# Patient Record
(demographics unavailable — no encounter records)

---

## 2024-10-08 NOTE — REVIEW OF SYSTEMS
[As Noted in HPI] : as noted in HPI [Anxiety] : anxiety [Depression] : depression [Negative] : Integumentary

## 2024-10-11 NOTE — HISTORY OF PRESENT ILLNESS
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    Currently in NP school. Not working at the time because of memory issues interfering with his work. Lives at home with Parents and Grandmother. Has 1 sister.  Denies any use of alcohol, cigarettes and illicit drugs.  Started drinking Ginko tea 1 week ago but denies use of any other herbs and supplements.   No family history of liver disease or cancer.   Patient was last seen by Dr. Pop in  and lost to follow up.  According to patient, referred by Cards (Dr. Epperson) due to "recent increase in IVC pressures and a cirrhotic-looking liver in CT since ". There is no image available in our EMR.   Patient feels well with good energy levels and excellent exercise tolerance.  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 Started on Bupropion due to concerns of depression.  No history of chest pain, SOB, abdominal pain, jaundice, ascites or edema, hematemesis or melena.  No recent weight loss or weight gain.   [Labs] - 2024: normal LFTs.  - HbA1c 6.4% in . Hyperlipidemia, elevated LDL and normal AFP in .  - HepB immune. Hep A and C negative in .   Test results:  - MRI/MRE () Subtle hepatic surface nodularity. No suspicious hepatic lesion. IVC and hepatic veins are not dilated. No steatosis, no iron deposition. Liver stiffness: 4-5 kPa - stage 3 to 4 fibrosis.

## 2024-10-11 NOTE — ASSESSMENT
[FreeTextEntry1] : Mr. FRANZ is a 27-year-old man with pre-DM, HLD, h/o Fontan procedure as a  due to hypoplastic left heart syndrome and bilateral superior vena cavae, IVC stent, paroxysmal Afib, R renal infarct x2, anxiety/depression.   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  PLT low normal.  - imaging: MR elastography showed subtle surface nodularity, normal spleen and gallbladder. Patent hepatic veins   - no liver lesion   - DD of nodularity includes cirrhosis and NRH (nodular regenerative hyperplasia) - overweight BMI, but due to muscular built  - recently increased increased IVC pressure as per cardiology note  - forgetfulness as above: unlikely due to liver disease as bloodwork last year and imaging overall do not suggest advanced liver disease. 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   PLAN - MELD labs, Ammonia, HbA1c, Lipid panel, and AFP/AFP-L3.  - Abdominal US  - Fibro Scan  - may need screening EGD   - forgetfulness: trial of snacks between meals - RTO in 3 months   The patient was seen with Dr. Nagi Bustos.  I was physically present for key portions of the exam. I have made edits to the note above where necessary and agree with the assessment and plan, which were discussed with me.

## 2024-10-11 NOTE — PHYSICAL EXAM
[General Appearance - Alert] : alert [General Appearance - In No Acute Distress] : in no acute distress [General Appearance - Well Nourished] : well nourished [General Appearance - Well Developed] : well developed [General Appearance - Well-Appearing] : healthy appearing [Sclera] : the sclera and conjunctiva were normal [Respiration, Rhythm And Depth] : normal respiratory rhythm and effort [Heart Rate And Rhythm] : heart rate was normal and rhythm regular [Abdomen Soft] : soft [Abdomen Tenderness] : non-tender [Skin Color & Pigmentation] : normal skin color and pigmentation [] : no rash [Skin Lesions] : no skin lesions [No Focal Deficits] : no focal deficits [Oriented To Time, Place, And Person] : oriented to person, place, and time [Impaired Insight] : insight and judgment were intact [Affect] : the affect was normal [Mood] : the mood was normal [Scleral Icterus] : No Scleral Icterus [Abdominal  Ascites] : no ascites [Asterixis] : no asterixis observed [Jaundice] : No jaundice

## 2024-11-20 NOTE — PHYSICAL EXAM
[General Appearance - Alert] : alert [General Appearance - In No Acute Distress] : in no acute distress [Auscultation Breath Sounds / Voice Sounds] : breath sounds clear to auscultation bilaterally [Chest Surgical / Traumatic Scar] : chest incision well healed [Heart Rate And Rhythm] : normal heart rate and rhythm [Heart Sounds Click] : no clicks [Abdomen Soft] : soft [Bowel Sounds] : normal bowel sounds [Nondistended] : nondistended [Abdomen Tenderness] : non-tender [Nail Clubbing] : no clubbing  or cyanosis of the fingernails [Motor Tone] : normal muscle strength and tone [Cervical Lymph Nodes Enlarged Anterior] : The anterior cervical nodes were normal [Cervical Lymph Nodes Enlarged Posterior] : The posterior cervical nodes were normal [] : no rash [Skin Lesions] : no lesions [Skin Turgor] : normal turgor [Demonstrated Behavior - Infant Nonreactive To Parents] : interactive [Mood] : mood and affect were appropriate for age [Demonstrated Behavior] : normal behavior [FreeTextEntry7] : single S1S2, SM

## 2024-11-20 NOTE — DISCUSSION/SUMMARY
[Needs SBE Prophylaxis] : [unfilled]  needs bacterial endocarditis prophylaxis. SBE prophylaxis is indicated for dental and invasive ENT procedures. (Circulation. 2007; 116: 2606-8050) [FreeTextEntry1] : Yasmani is a 26-year-old young man with HLHS, s/p Fontan procedure s/p renal infarct x 2.  Long-term complications of Fontan include arrhythmia, valve disorders, thromboembolic events, heart failure, both diastolic and systolic, protein losing enteropathy, liver, and kidney disease.   From a functional perspective, Yasmani is doing well. His chest pain resolved with allopurinol. He will have a holter, given his history of SVT.   He was evaluated by renal.  It was recommended he have yearly testing of creatinine/eGFR and urine albumin-to-creatinine ratio.   We discussed that the risk of a thrombo-embolic event can have devastating effects and he understands the importance of adherence with Xarelto.    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 He has an evaluation with Dr. Melissa Piedra, hepatologist.   SBE Prophylaxis We also discussed good dental hygiene with routine dental visits every six months. As per ACC guidelines, endocarditis prophylaxis is recommended in those undergoing dental procedures with prosthetic cardiac valves, unrepaired cyanotic heart disease including palliative shunts and conduits, repaired CHD with residual defects at site of adjacent to patch or prosthetic device, repaired CHD with prosthetic material or device during the first 6 months after the procedure and in those patients with a history of infective endocarditis. Amoxicillin 2gm or another appropriate antibiotic should be taken 30 to 60 minutes prior to the procedure.   Heart Healthy Lifestyle We discussed a heart-healthy lifestyle, including Mediterranean diet, weight management, and avoiding smoking/vaping and excessive alcohol. We also discussed exercising 30 minute a day.   Medical Home Part of today's discussion included identifying a core medical team for as the patient's medical home. It is important that the patient feels comfortable with providers in subspecialties and that the providers are well-versed in the patient's cardiac condition. We can provide referrals to doctors who we have identified as collaborative and conscientious of the patient's complex medical history.    PLAN: -annual follow up with PCP -yearly creatinine/eGFR and urine albumin-to-creatinine ratio -Hepatology follow up -continue Xarelto - holter

## 2024-11-20 NOTE — PHYSICAL EXAM
[General Appearance - Alert] : alert [General Appearance - In No Acute Distress] : in no acute distress [Auscultation Breath Sounds / Voice Sounds] : breath sounds clear to auscultation bilaterally [Chest Surgical / Traumatic Scar] : chest incision well healed [Heart Rate And Rhythm] : normal heart rate and rhythm [Heart Sounds Click] : no clicks [Abdomen Soft] : soft [Bowel Sounds] : normal bowel sounds [Nondistended] : nondistended [Abdomen Tenderness] : non-tender [Nail Clubbing] : no clubbing  or cyanosis of the fingernails [Motor Tone] : normal muscle strength and tone [Cervical Lymph Nodes Enlarged Anterior] : The anterior cervical nodes were normal [Cervical Lymph Nodes Enlarged Posterior] : The posterior cervical nodes were normal [Skin Lesions] : no lesions [] : no rash [Skin Turgor] : normal turgor [Demonstrated Behavior - Infant Nonreactive To Parents] : interactive [Demonstrated Behavior] : normal behavior [Mood] : mood and affect were appropriate for age [FreeTextEntry7] : single S1S2, SM

## 2024-11-20 NOTE — HISTORY OF PRESENT ILLNESS
[FreeTextEntry1] : We had the pleasure of seeing Yasmani Lorenzo in [facility]. 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 He underwent staged repair by Dr. Ordonez.  1. 2/1997 - [place] procedure, Dr. Ordonez. [facility] 2. 10/1997 - Bilateral bi-directional chastity, Dr. Ordonez. [facility], [place], [place] 3. 2000 - Modified non fenestrated lateral tunnel Fontan, Dr. Ordonez. [place], [place]  Yasmani underwent diagnostic cath February 2018. His previously placed Fontan limb stent and branch pulmonary artery stent were patent. His mean Fontan pressures were 12 mmHg with wedge pressures equal to RV end diastolic pressure of 6 mmHg. His Cardiac output was 4.5 L/min, Qp:Qs 0.72:1, and a PVRi of 2.62 W*U. The source of the desaturation was identified as a hepatic collateral vessel which could not be engaged for catheter based closure. Sats were 88% on room air.  Yasmani has a remote history of SVT noted on Holter. In the past, he was on Betaxolol but discontinued it due to "brain fog" and sexual dysfunction. He has remained off antiarrhythmics. His screening Holters have been negative for any arrhythmia.  He is propranolol for anxiety.   He has a history of a renal infarct of unknown etiology and duration. He was started on and remains on Xarelto.  He feels nauseous after fatty meals and will follow up with GI.    He had a recent ER visit for reproducible chest pain. Troponin was negative and he knows from previous experience that this was costochondritis. He had his uric acid tested and was started on allopurinol.  His chest pain resolved after starting treatment.    He denies chest pain, shortness of breath, palpitations, dizziness, syncope, swelling of leg or PND.  He can walk a flight of stairs without difficulty.

## 2024-11-20 NOTE — CONSULT LETTER
[Today's Date] : [unfilled] [Name] : Name: [unfilled] [] : : ~~ [Today's Date:] : [unfilled] [Dear  ___:] : Dear Dr. [unfilled]: [Consult] : I had the pleasure of evaluating your patient, [unfilled]. My full evaluation follows. [Consult - Multiple Provider] : Thank you very much for allowing us to participate in the care of this patient. If you have any questions, please do not hesitate to contact us. [Sincerely,] : Sincerely, [DrTory  ___] : Dr. SCHAFER [FreeTextEntry4] : Sujata Blair, DO [FreeTextEntry5] : [address] [FreeTextEntry6] : [place] [address] [de-identified] : Noa Garcia, MSN, CPNP-AC, PC Pediatric Cardiology, Adult Congenital Cardiology [facility] Physician [facility]  Gabi Epperson MD, BRITTANY Director, Adult Congenital Heart , High Risk Cardiovascular Obstetrics [facility] Physician [facility]  [address]: [phone] [facility] Office: [phone] [facility] Office: [phone]

## 2024-11-20 NOTE — REASON FOR VISIT
[Initial Consultation] : an initial consultation for [Hypoplastic Left Heart Syndrome] : hypoplastic left heart syndrome [Aortic Insufficiency] : aortic insufficiency [Patient] : patient [FreeTextEntry3] : s/p Fontan procedure

## 2024-11-20 NOTE — DISCUSSION/SUMMARY
[Needs SBE Prophylaxis] : [unfilled]  needs bacterial endocarditis prophylaxis. SBE prophylaxis is indicated for dental and invasive ENT procedures. (Circulation. 2007; 116: 9100-0855) [FreeTextEntry1] : Yasmani is a 26-year-old young man with HLHS, s/p Fontan procedure s/p renal infarct x 2.  Long-term complications of Fontan include arrhythmia, valve disorders, thromboembolic events, heart failure, both diastolic and systolic, protein losing enteropathy, liver, and kidney disease.   From a functional perspective, Yasmani is doing well. His chest pain resolved with allopurinol. He will have a holter, given his history of SVT.   He was evaluated by renal.  It was recommended he have yearly testing of creatinine/eGFR and urine albumin-to-creatinine ratio.   We discussed that the risk of a thrombo-embolic event can have devastating effects and he understands the importance of adherence with Xarelto.    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 He has an evaluation with Dr. Melissa Piedra, hepatologist.   SBE Prophylaxis We also discussed good dental hygiene with routine dental visits every six months. As per ACC guidelines, endocarditis prophylaxis is recommended in those undergoing dental procedures with prosthetic cardiac valves, unrepaired cyanotic heart disease including palliative shunts and conduits, repaired CHD with residual defects at site of adjacent to patch or prosthetic device, repaired CHD with prosthetic material or device during the first 6 months after the procedure and in those patients with a history of infective endocarditis. Amoxicillin 2gm or another appropriate antibiotic should be taken 30 to 60 minutes prior to the procedure.   Heart Healthy Lifestyle We discussed a heart-healthy lifestyle, including Mediterranean diet, weight management, and avoiding smoking/vaping and excessive alcohol. We also discussed exercising 30 minute a day.   Medical Home Part of today's discussion included identifying a core medical team for as the patient's medical home. It is important that the patient feels comfortable with providers in subspecialties and that the providers are well-versed in the patient's cardiac condition. We can provide referrals to doctors who we have identified as collaborative and conscientious of the patient's complex medical history.    PLAN: -annual follow up with PCP -yearly creatinine/eGFR and urine albumin-to-creatinine ratio -Hepatology follow up -continue Xarelto - holter

## 2024-11-20 NOTE — CONSULT LETTER
[Today's Date] : [unfilled] [Name] : Name: [unfilled] [] : : ~~ [Today's Date:] : [unfilled] [Dear  ___:] : Dear Dr. [unfilled]: [Consult] : I had the pleasure of evaluating your patient, [unfilled]. My full evaluation follows. [Consult - Multiple Provider] : Thank you very much for allowing us to participate in the care of this patient. If you have any questions, please do not hesitate to contact us. [Sincerely,] : Sincerely, [DrTory  ___] : Dr. SCHAFER [FreeTextEntry4] : Sujata Blair, DO [FreeTextEntry5] : [address] [FreeTextEntry6] : [place] [address] [de-identified] : Noa Garcia, MSN, CPNP-AC, PC Pediatric Cardiology, Adult Congenital Cardiology [facility] Physician [facility]  Gabi Epperson MD, BRITTANY Director, Adult Congenital Heart , High Risk Cardiovascular Obstetrics [facility] Physician [facility]  [address]: [phone] [facility] Office: [phone] [facility] Office: [phone]

## 2024-11-20 NOTE — HISTORY OF PRESENT ILLNESS
[FreeTextEntry1] : We had the pleasure of seeing Yasmani Lorenzo in [facility]. 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 He underwent staged repair by Dr. Ordonez.  1. 2/1997 - Travis procedure, Dr. Ordonez. [facility] 2. 10/1997 - Bilateral bi-directional chastity, Dr. Ordonez. [facility], [place], [place] 3. 2000 - Modified non fenestrated lateral tunnel Fontan, Dr. Ordonez. [place], [place]  Yasmani underwent diagnostic cath February 2018. His previously placed Fontan limb stent and branch pulmonary artery stent were patent. His mean Fontan pressures were 12 mmHg with wedge pressures equal to RV end diastolic pressure of 6 mmHg. His Cardiac output was 4.5 L/min, Qp:Qs 0.72:1, and a PVRi of 2.62 W*U. The source of the desaturation was identified as a hepatic collateral vessel which could not be engaged for catheter based closure. Sats were 88% on room air.  Yasmani has a remote history of SVT noted on Holter. In the past, he was on Betaxolol but discontinued it due to "brain fog" and sexual dysfunction. He has remained off antiarrhythmics. His screening Holters have been negative for any arrhythmia.  He is propranolol for anxiety.   He has a history of a renal infarct of unknown etiology and duration. He was started on and remains on Xarelto.  He feels nauseous after fatty meals and will follow up with GI.    He had a recent ER visit for reproducible chest pain. Troponin was negative and he knows from previous experience that this was costochondritis. He had his uric acid tested and was started on allopurinol.  His chest pain resolved after starting treatment.    He denies chest pain, shortness of breath, palpitations, dizziness, syncope, swelling of leg or PND.  He can walk a flight of stairs without difficulty.

## 2024-12-30 NOTE — DISCUSSION/SUMMARY
[FreeTextEntry1] : We had the pleasure of seeing Yasmani Lorenzo in [facility]. 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   Surgical Procedures 1. [place] Procedure by Dr. Ordonez, [facility] 02/1997 2. Bilateral bi-directional Felix, Dr. Ordonez, [facility] 10/1997 3. Modified non-fenestrated lateral tunnel Fontan, Dr. Ordonez, [facility] 2000 4. Balloon and stenting to proximal LPA and stent into Fontan circuit 03/21/2016  Yasmani underwent a diagnostic RHC February 2018 that revealed patent previously placed Fontan limb stent and left branch pulmonary stent. His mean Fontan pressures were 12 mmHg with wedge pressures equal to RV end diastolic pressure of 6 mmHg. His cardiac output was 4.5 L/min, Qp:Qs 0.72:!, and a PVRi of 2.62 SALCEDO. The source of his desaturation was identified as a hepatic collateral vessel which could not be engaged for catheter based closure. Saturations were 88% on room air.   Yasmani's other PMHx is notable for both a history of SVT and renal infarction.   He is currently feeling well.  His echo today is relatively unchanged from his last.  He has mild hypokinesia of the systemic RV, patent Felix with limited views of the Fontan circuit and PA.    Plan:  - Echocardiogram unchanged from previous. - cMRI to evaluate Fontan circuit, not well seen on echo - UTD with Hepatologist, has liver scan next week.  - Holter in 09/2024 with rare ectopy.  - Patient has lab work throughout the year, but some annual testing missing. Ordered Vitamin D, BNP, GGT, TSH, Free T4, ESR, CRP.  - Continue Xarelto for recurrent renal infarcts. - Tolerating Propranolol ER 60mg PO qday and Adderall XL 15mg PO qday without issue or worsening palpitations. - No s/s of active bleeding on Xarelto.   SBE Prophylaxis We also discussed good dental hygiene with routine dental visits every six months. As per ACC guidelines, endocarditis prophylaxis is recommended in those undergoing dental procedures with prosthetic cardiac valves, unrepaired cyanotic heart disease including palliative shunts and conduits, repaired CHD with residual defects at site of adjacent to patch or prosthetic device, repaired CHD with prosthetic material or device during the first 6 months after the procedure and in those patients with a history of infective endocarditis. Amoxicillin 2gm or another appropriate antibiotic should be taken 30 to 60 minutes prior to the procedure.   Heart Healthy Lifestyle  We discussed a heart-healthy lifestyle, including Mediterranean diet, weight management, and avoiding smoking/vaping and excessive alcohol. We also discussed exercising 30 minute a day.

## 2024-12-30 NOTE — HISTORY OF PRESENT ILLNESS
[FreeTextEntry1] : We had the pleasure of seeing Yasmani Lorenzo in [facility]. 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   Surgical Procedures 1. [place] Procedure by Dr. Ordonez, [facility] 1997 2. Bilateral bi-directional Felix, Dr. Ordonez, [facility] 10/1997 3. Modified non-fenestrated lateral tunnel Fontan, Dr. Ordonez, [facility]  4. Balloon and stenting to proximal LPA and stent into Fontan circuit 2016  Yasmani underwent a diagnostic RHC 2018 that revealed patent previously placed Fontan limb stent and left branch pulmonary stent. His mean Fontan pressures were 12 mmHg with wedge pressures equal to RV end diastolic pressure of 6 mmHg. His cardiac output was 4.5 L/min, Qp:Qs 0.72:1, and a PVRi of 2.62 SALCEDO. The source of his desaturation was identified as a hepatic collateral vessel which could not be engaged for catheter based closure. Saturations were 88% on room air.   Yasmani's other PMHx is notable for both a history of SVT and renal infarction. In the past he was Betaxolol for SVT, but it was discontinued due to "brain fog" and sexual dysfunction. He has remained off antiarrythmics since, with Holters negative for any arryhthmias. He on propranolol for anxiety. His renal infarct was of unknown etiology for which he was started, and still remains, on Xarelto.   He is currently in NP school, and was started on Adderall XR 15mg PO qday to help with focus. He is UTD with Dr. Valle for Hepatology, and has a scanned planned for next week.   He currently feels well, denies any fevers, chills, chest pain, shortness of breath, leg swelling, orthopnea, PND, abdominal pain, abdominal swelling, N/V/D, headache, dizziness, or syncope. He can walk a flight of stairs without difficulty.   Family Hx: Maternal GF- MI in his 50s,   Social Hx: Works as a nurse, for [employer].  Exercises regularly with lifting low weights and cardio.  Denies smoking, alcohol, drugs.  Recently started on Adderall XL for NP school.

## 2024-12-30 NOTE — CONSULT LETTER
[Today's Date] : [unfilled] [Dear  ___:] : Dear Dr. [unfilled]: [Consult - Single Provider] : Thank you very much for allowing me to participate in the care of this patient. If you have any questions, please do not hesitate to contact me. [Sincerely,] : Sincerely, [FreeTextEntry4] : Dr Sujata Blair [FreeTextEntry5] : [address] [FreeTextEntry6] : [facility] [address]

## 2024-12-30 NOTE — CARDIOLOGY SUMMARY
[de-identified] : 11/13/2023 [Today's Date] : [unfilled] [FreeTextEntry1] : SR, HR 73, RBBB [FreeTextEntry2] : Summary: 1. 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 Status post transcatheter stent placement in the superior aspect of the Fontan circuit, and balloon dilation and stent placement in the proximal left pulmonary artery (3/2016)." 2. Status post surgically created interatrial communication, non restrictive. 3. There is a size discrepancy between the abigail ascending aorta/transverse arch and proximal descending aorta, as is typical of Travis arch reconstruction. Normal Doppler profiles across the arch. 4. The right and left superior vena cavae and cavopulmonary (Felix) anastomoses all appear widely patent with no evidence of obstruction, with low velocity spectral Doppler profiles. Limited imaging of the IVC connection with the Fontan circuit; Doppler profile with low velocity. 5. On limited imaging, the proximal RPA and proximal LPA are not seen well enough to comment. there appears to be some acceleration across the upper Fontan limb to the left of the Right Cavopulmonary PA junction. 6. Mild tricuspid valve regurgitation. 7. Dilated right ventricle and moderate right ventricular hypertrophy. 8. Mild global hypokinesia of the right ventricle. 9. No pericardial effusion. 10. There has been no significant interval change. [de-identified] : 07/2023 [de-identified] : General Quality of Test: This was a Maximal test. (RERmax 1.25 ). The patient did not make a good effort. The test was stopped because of exhaustion and leg pain.  The Breathing Reserve  was 41.1% which is normal. The patient performed a low amount of work . Max Work= 155. There were no technical problems with the test.   ECG Data The resting rhythm was NSR. The Heart Rate Response was normal with a maximum HR of 150 bpm, which was 77.3 % of predicted. This response suggested poor conditioning.   BP Response: The BP Response was normal. The BP increased appropriately during exercise and decreased appropriately during recovery. The max BP was 165/80 mmHg. There were no problems with BP measurement.   Atrial Ectopy: At the beginning of the test, no atrial ectopy was noted. During the test, no atrial ectopy was noted. During recovery, no atrial ectopy was noted.   Ventricular Ectopy: At the beginning of the test, no ventricular ectopy was noted. During the test, no ventricular ectopy was noted. During recovery, noted. isolated PVC.   QTc Measurement: The QTc was not assessed with exercise. QTc (Baseline) 408 msec. The QTc at 2 min exercise, 5 min exercise, peak exercise, and 3 min recovery was not calculated.   ST Changes: There were no significant ST segment changes.   Symptoms: exhaustion and leg pain at the end of test Gas Analysis Data   General Aerobic Assessment The patient's aerobic capacity was below normal. The patient's max VO2 = 26.7 ml/kg/min, The patient's max VO2 = 2028 ml/min, ( 59 % of predicted). The AT was reached, The VO2 @ AT= 1490 ml/min, ( 43 % of predicted VO2 max).    Ventilation Limitations The patient's exercise was not limited by ventilation. The BR = 41.1 % (nl 30-50%).   The maximal respiratory rate was normal . The RRmax = 51 bpm. There was evidence of restrictive lung disease.  There was evidence of obstructive lung disease.  FVC = 3.51 L. ( 74 % predicted). FEV1 = 2.74 L. ( 68 % predicted). FEV1/FVC = 78 %.   [de-identified] : Impression:  Hypoplastic left heart syndrome with severely hypoplastic left heart. Mildly dilated right ventricle (RVEDVi: 115 ml/m2; z: 2.0) with mild to moderately decreased systolic function (RVEF: 39%; z: -4). Starting from mild level to the apex there is transmural hyperenhancement of the inferior wall of the right ventricle and also some portion of the septum.   Mild neoaortic regurgitation (RF: 7%). Patent DKS anastomosis (connection of the native aorta and abigail aorta). There is a size discrepancy between the abigail ascending aorta/transverse arch and proximal thoracic descending arota as seen in a Travis reconstrcution of the arch but no discrete stenosis.   Normal and patent origins of the right and left coronary arteries from the native aorta.   Fontan conduit is widely patent including the stents in the IVC limb and central pulmonary artery. No discrete branch pulmonary artery stenosis. Estimated differential flow to the lungs is 58% to the right and 42% to the left (taking PC flows). No obvious major aortopulmonary or venovenous collaterals seen.  [de-identified] : 10/01/2021

## 2024-12-30 NOTE — CONSULT LETTER
[Today's Date] : [unfilled] [Dear  ___:] : Dear Dr. [unfilled]: [Consult - Single Provider] : Thank you very much for allowing me to participate in the care of this patient. If you have any questions, please do not hesitate to contact me. [Sincerely,] : Sincerely, [FreeTextEntry4] : Dr Sujata Blair [FreeTextEntry5] : [phone] [FreeTextEntry6] : [facility] [id number]

## 2024-12-30 NOTE — PHYSICAL EXAM
[General Appearance - Alert] : alert [General Appearance - In No Acute Distress] : in no acute distress [General Appearance - Well Nourished] : well nourished [Facies] : the head and face were normal in appearance [Auscultation Breath Sounds / Voice Sounds] : breath sounds clear to auscultation bilaterally [Respiration, Rhythm And Depth] : normal respiratory rhythm and effort [Chest Surgical / Traumatic Scar] : chest incision well healed [Heart Sounds] : normal S1 and S2 [Systolic] : systolic [II] : a grade 2/6 [LUSB] : LUSB [Bowel Sounds] : normal bowel sounds [Abdomen Soft] : soft [Nail Clubbing] : no clubbing  or cyanosis of the fingers [Musculoskeletal Exam: Normal Movement Of All Extremities] : normal movements of all extremities [Delayed Developmental Milestones] : normal neurologic development for age [Mood] : mood and affect were appropriate for age

## 2024-12-30 NOTE — HISTORY OF PRESENT ILLNESS
[FreeTextEntry1] : We had the pleasure of seeing Yasmani Lorenzo in [facility]. 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   Surgical Procedures 1. [place] Procedure by Dr. Ordonez, [facility] 1997 2. Bilateral bi-directional Felix, Dr. Ordonez, [facility] 10/1997 3. Modified non-fenestrated lateral tunnel Fontan, Dr. Ordonez, [facility]  4. Balloon and stenting to proximal LPA and stent into Fontan circuit 2016  Yasmani underwent a diagnostic RHC 2018 that revealed patent previously placed Fontan limb stent and left branch pulmonary stent. His mean Fontan pressures were 12 mmHg with wedge pressures equal to RV end diastolic pressure of 6 mmHg. His cardiac output was 4.5 L/min, Qp:Qs 0.72:1, and a PVRi of 2.62 SALCEDO. The source of his desaturation was identified as a hepatic collateral vessel which could not be engaged for catheter based closure. Saturations were 88% on room air.   Yasmani's other PMHx is notable for both a history of SVT and renal infarction. In the past he was Betaxolol for SVT, but it was discontinued due to "brain fog" and sexual dysfunction. He has remained off antiarrythmics since, with Holters negative for any arryhthmias. He on propranolol for anxiety. His renal infarct was of unknown etiology for which he was started, and still remains, on Xarelto.   He is currently in NP school, and was started on Adderall XR 15mg PO qday to help with focus. He is UTD with Dr. Valle for Hepatology, and has a scanned planned for next week.   He currently feels well, denies any fevers, chills, chest pain, shortness of breath, leg swelling, orthopnea, PND, abdominal pain, abdominal swelling, N/V/D, headache, dizziness, or syncope. He can walk a flight of stairs without difficulty.   Family Hx: Maternal GF- MI in his 50s,   Social Hx: Works as a nurse, for [employer].  Exercises regularly with lifting low weights and cardio.  Denies smoking, alcohol, drugs.  Recently started on Adderall XL for NP school.

## 2024-12-30 NOTE — CARDIOLOGY SUMMARY
[de-identified] : 11/13/2023 [Today's Date] : [unfilled] [FreeTextEntry1] : SR, HR 73, RBBB [FreeTextEntry2] : Summary: 1. 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 Status post transcatheter stent placement in the superior aspect of the Fontan circuit, and balloon dilation and stent placement in the proximal left pulmonary artery (3/2016)." 2. Status post surgically created interatrial communication, non restrictive. 3. There is a size discrepancy between the abigail ascending aorta/transverse arch and proximal descending aorta, as is typical of Travis arch reconstruction. Normal Doppler profiles across the arch. 4. The right and left superior vena cavae and cavopulmonary (Felix) anastomoses all appear widely patent with no evidence of obstruction, with low velocity spectral Doppler profiles. Limited imaging of the IVC connection with the Fontan circuit; Doppler profile with low velocity. 5. On limited imaging, the proximal RPA and proximal LPA are not seen well enough to comment. there appears to be some acceleration across the upper Fontan limb to the left of the Right Cavopulmonary PA junction. 6. Mild tricuspid valve regurgitation. 7. Dilated right ventricle and moderate right ventricular hypertrophy. 8. Mild global hypokinesia of the right ventricle. 9. No pericardial effusion. 10. There has been no significant interval change. [de-identified] : 07/2023 [de-identified] : General Quality of Test: This was a Maximal test. (RERmax 1.25 ). The patient did not make a good effort. The test was stopped because of exhaustion and leg pain.  The Breathing Reserve  was 41.1% which is normal. The patient performed a low amount of work . Max Work= 155. There were no technical problems with the test.   ECG Data The resting rhythm was NSR. The Heart Rate Response was normal with a maximum HR of 150 bpm, which was 77.3 % of predicted. This response suggested poor conditioning.   BP Response: The BP Response was normal. The BP increased appropriately during exercise and decreased appropriately during recovery. The max BP was 165/80 mmHg. There were no problems with BP measurement.   Atrial Ectopy: At the beginning of the test, no atrial ectopy was noted. During the test, no atrial ectopy was noted. During recovery, no atrial ectopy was noted.   Ventricular Ectopy: At the beginning of the test, no ventricular ectopy was noted. During the test, no ventricular ectopy was noted. During recovery, noted. isolated PVC.   QTc Measurement: The QTc was not assessed with exercise. QTc (Baseline) 408 msec. The QTc at 2 min exercise, 5 min exercise, peak exercise, and 3 min recovery was not calculated.   ST Changes: There were no significant ST segment changes.   Symptoms: exhaustion and leg pain at the end of test Gas Analysis Data   General Aerobic Assessment The patient's aerobic capacity was below normal. The patient's max VO2 = 26.7 ml/kg/min, The patient's max VO2 = 2028 ml/min, ( 59 % of predicted). The AT was reached, The VO2 @ AT= 1490 ml/min, ( 43 % of predicted VO2 max).    Ventilation Limitations The patient's exercise was not limited by ventilation. The BR = 41.1 % (nl 30-50%).   The maximal respiratory rate was normal . The RRmax = 51 bpm. There was evidence of restrictive lung disease.  There was evidence of obstructive lung disease.  FVC = 3.51 L. ( 74 % predicted). FEV1 = 2.74 L. ( 68 % predicted). FEV1/FVC = 78 %.   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 Mildly dilated right ventricle (RVEDVi: 115 ml/m2; z: 2.0) with mild to moderately decreased systolic function (RVEF: 39%; z: -4). Starting from mild level to the apex there is transmural hyperenhancement of the inferior wall of the right ventricle and also some portion of the septum.   Mild neoaortic regurgitation (RF: 7%). Patent DKS anastomosis (connection of the native aorta and abigail aorta). There is a size discrepancy between the abigail ascending aorta/transverse arch and proximal thoracic descending arota as seen in a Travis reconstrcution of the arch but no discrete stenosis.   Normal and patent origins of the right and left coronary arteries from the native aorta.   Fontan conduit is widely patent including the stents in the IVC limb and central pulmonary artery. No discrete branch pulmonary artery stenosis. Estimated differential flow to the lungs is 58% to the right and 42% to the left (taking PC flows). No obvious major aortopulmonary or venovenous collaterals seen.

## 2024-12-30 NOTE — REASON FOR VISIT
[S/P Cardiac Surgery] : status post cardiac surgery [Patient] : patient [Follow-Up] : a follow-up visit for

## 2024-12-30 NOTE — DISCUSSION/SUMMARY
[FreeTextEntry1] : We had the pleasure of seeing Yasmani Lorenzo in [facility]. 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   Surgical Procedures 1. [place] Procedure by Dr. Ordonez, [facility] 02/1997 2. Bilateral bi-directional Felix, Dr. Ordonez, [facility] 10/1997 3. Modified non-fenestrated lateral tunnel Fontan, Dr. Ordonez, [facility] 2000 4. Balloon and stenting to proximal LPA and stent into Fontan circuit 03/21/2016  Yasmani underwent a diagnostic RHC February 2018 that revealed patent previously placed Fontan limb stent and left branch pulmonary stent. His mean Fontan pressures were 12 mmHg with wedge pressures equal to RV end diastolic pressure of 6 mmHg. His cardiac output was 4.5 L/min, Qp:Qs 0.72:!, and a PVRi of 2.62 SALCEDO. The source of his desaturation was identified as a hepatic collateral vessel which could not be engaged for catheter based closure. Saturations were 88% on room air.   Yasmani's other PMHx is notable for both a history of SVT and renal infarction.   He is currently feeling well.  His echo today is relatively unchanged from his last.  He has mild hypokinesia of the systemic RV, patent Felix with limited views of the Fontan circuit and PA.    Plan:  - Echocardiogram unchanged from previous. - cMRI to evaluate Fontan circuit, not well seen on echo - UTD with Hepatologist, has liver scan next week.  - Holter in 09/2024 with rare ectopy.  - Patient has lab work throughout the year, but some annual testing missing. Ordered Vitamin D, BNP, GGT, TSH, Free T4, ESR, CRP.  - Continue Xarelto for recurrent renal infarcts. - Tolerating Propranolol ER 60mg PO qday and Adderall XL 15mg PO qday without issue or worsening palpitations. - No s/s of active bleeding on Xarelto.   SBE Prophylaxis We also discussed good dental hygiene with routine dental visits every six months. As per ACC guidelines, endocarditis prophylaxis is recommended in those undergoing dental procedures with prosthetic cardiac valves, unrepaired cyanotic heart disease including palliative shunts and conduits, repaired CHD with residual defects at site of adjacent to patch or prosthetic device, repaired CHD with prosthetic material or device during the first 6 months after the procedure and in those patients with a history of infective endocarditis. Amoxicillin 2gm or another appropriate antibiotic should be taken 30 to 60 minutes prior to the procedure.   Heart Healthy Lifestyle  We discussed a heart-healthy lifestyle, including Mediterranean diet, weight management, and avoiding smoking/vaping and excessive alcohol. We also discussed exercising 30 minute a day.

## 2024-12-30 NOTE — HISTORY OF PRESENT ILLNESS
[FreeTextEntry1] : We had the pleasure of seeing Yasmani Lorenzo in [facility]. 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   Surgical Procedures 1. [place] Procedure by Dr. Ordonez, [facility] 1997 2. Bilateral bi-directional Felix, Dr. Ordonez, [facility] 10/1997 3. Modified non-fenestrated lateral tunnel Fontan, Dr. Ordonez, [facility]  4. Balloon and stenting to proximal LPA and stent into Fontan circuit 2016  Yasmani underwent a diagnostic RHC 2018 that revealed patent previously placed Fontan limb stent and left branch pulmonary stent. His mean Fontan pressures were 12 mmHg with wedge pressures equal to RV end diastolic pressure of 6 mmHg. His cardiac output was 4.5 L/min, Qp:Qs 0.72:1, and a PVRi of 2.62 SALCEDO. The source of his desaturation was identified as a hepatic collateral vessel which could not be engaged for catheter based closure. Saturations were 88% on room air.   Yasmani's other PMHx is notable for both a history of SVT and renal infarction. In the past he was Betaxolol for SVT, but it was discontinued due to "brain fog" and sexual dysfunction. He has remained off antiarrythmics since, with Holters negative for any arryhthmias. He on propranolol for anxiety. His renal infarct was of unknown etiology for which he was started, and still remains, on Xarelto.   He is currently in NP school, and was started on Adderall XR 15mg PO qday to help with focus. He is UTD with Dr. Valle for Hepatology, and has a scanned planned for next week.   He currently feels well, denies any fevers, chills, chest pain, shortness of breath, leg swelling, orthopnea, PND, abdominal pain, abdominal swelling, N/V/D, headache, dizziness, or syncope. He can walk a flight of stairs without difficulty.   Family Hx: Maternal GF- MI in his 50s,   Social Hx: Works as a nurse, for [employer].  Exercises regularly with lifting low weights and cardio.  Denies smoking, alcohol, drugs.  Recently started on Adderall XL for NP school.

## 2024-12-30 NOTE — CONSULT LETTER
[Today's Date] : [unfilled] [Dear  ___:] : Dear Dr. [unfilled]: [Consult - Single Provider] : Thank you very much for allowing me to participate in the care of this patient. If you have any questions, please do not hesitate to contact me. [Sincerely,] : Sincerely, [FreeTextEntry4] : Dr Sujata Blair [FreeTextEntry5] : [address] [FreeTextEntry6] : [facility] [id number]

## 2024-12-30 NOTE — CARDIOLOGY SUMMARY
[de-identified] : 11/13/2023 [Today's Date] : [unfilled] [FreeTextEntry1] : SR, HR 73, RBBB [FreeTextEntry2] : Summary: 1. 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 Status post transcatheter stent placement in the superior aspect of the Fontan circuit, and balloon dilation and stent placement in the proximal left pulmonary artery (3/2016)." 2. Status post surgically created interatrial communication, non restrictive. 3. There is a size discrepancy between the abigail ascending aorta/transverse arch and proximal descending aorta, as is typical of Travis arch reconstruction. Normal Doppler profiles across the arch. 4. The right and left superior vena cavae and cavopulmonary (Felix) anastomoses all appear widely patent with no evidence of obstruction, with low velocity spectral Doppler profiles. Limited imaging of the IVC connection with the Fontan circuit; Doppler profile with low velocity. 5. On limited imaging, the proximal RPA and proximal LPA are not seen well enough to comment. there appears to be some acceleration across the upper Fontan limb to the left of the Right Cavopulmonary PA junction. 6. Mild tricuspid valve regurgitation. 7. Dilated right ventricle and moderate right ventricular hypertrophy. 8. Mild global hypokinesia of the right ventricle. 9. No pericardial effusion. 10. There has been no significant interval change. [de-identified] : 07/2023 [de-identified] : General Quality of Test: This was a Maximal test. (RERmax 1.25 ). The patient did not make a good effort. The test was stopped because of exhaustion and leg pain.  The Breathing Reserve  was 41.1% which is normal. The patient performed a low amount of work . Max Work= 155. There were no technical problems with the test.   ECG Data The resting rhythm was NSR. The Heart Rate Response was normal with a maximum HR of 150 bpm, which was 77.3 % of predicted. This response suggested poor conditioning.   BP Response: The BP Response was normal. The BP increased appropriately during exercise and decreased appropriately during recovery. The max BP was 165/80 mmHg. There were no problems with BP measurement.   Atrial Ectopy: At the beginning of the test, no atrial ectopy was noted. During the test, no atrial ectopy was noted. During recovery, no atrial ectopy was noted.   Ventricular Ectopy: At the beginning of the test, no ventricular ectopy was noted. During the test, no ventricular ectopy was noted. During recovery, noted. isolated PVC.   QTc Measurement: The QTc was not assessed with exercise. QTc (Baseline) 408 msec. The QTc at 2 min exercise, 5 min exercise, peak exercise, and 3 min recovery was not calculated.   ST Changes: There were no significant ST segment changes.   Symptoms: exhaustion and leg pain at the end of test Gas Analysis Data   General Aerobic Assessment The patient's aerobic capacity was below normal. The patient's max VO2 = 26.7 ml/kg/min, The patient's max VO2 = 2028 ml/min, ( 59 % of predicted). The AT was reached, The VO2 @ AT= 1490 ml/min, ( 43 % of predicted VO2 max).    Ventilation Limitations The patient's exercise was not limited by ventilation. The BR = 41.1 % (nl 30-50%).   The maximal respiratory rate was normal . The RRmax = 51 bpm. There was evidence of restrictive lung disease.  There was evidence of obstructive lung disease.  FVC = 3.51 L. ( 74 % predicted). FEV1 = 2.74 L. ( 68 % predicted). FEV1/FVC = 78 %.   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 Mildly dilated right ventricle (RVEDVi: 115 ml/m2; z: 2.0) with mild to moderately decreased systolic function (RVEF: 39%; z: -4). Starting from mild level to the apex there is transmural hyperenhancement of the inferior wall of the right ventricle and also some portion of the septum.   Mild neoaortic regurgitation (RF: 7%). Patent DKS anastomosis (connection of the native aorta and abigail aorta). There is a size discrepancy between the abigail ascending aorta/transverse arch and proximal thoracic descending arota as seen in a Travis reconstrcution of the arch but no discrete stenosis.   Normal and patent origins of the right and left coronary arteries from the native aorta.   Fontan conduit is widely patent including the stents in the IVC limb and central pulmonary artery. No discrete branch pulmonary artery stenosis. Estimated differential flow to the lungs is 58% to the right and 42% to the left (taking PC flows). No obvious major aortopulmonary or venovenous collaterals seen.

## 2024-12-30 NOTE — DISCUSSION/SUMMARY
[FreeTextEntry1] : We had the pleasure of seeing Yasmani Lorenzo in The [facility]. 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   Surgical Procedures 1. [place] Procedure by Dr. Ordonez, [facility] 02/1997 2. Bilateral bi-directional Felix, Dr. Ordonez, [facility] 10/1997 3. Modified non-fenestrated lateral tunnel Fontan, Dr. Ordonez, [facility] 2000 4. Balloon and stenting to proximal LPA and stent into Fontan circuit 03/21/2016  Yasmani underwent a diagnostic RHC February 2018 that revealed patent previously placed Fontan limb stent and left branch pulmonary stent. His mean Fontan pressures were 12 mmHg with wedge pressures equal to RV end diastolic pressure of 6 mmHg. His cardiac output was 4.5 L/min, Qp:Qs 0.72:!, and a PVRi of 2.62 SALCEDO. The source of his desaturation was identified as a hepatic collateral vessel which could not be engaged for catheter based closure. Saturations were 88% on room air.   Yasmani's other PMHx is notable for both a history of SVT and renal infarction.   He is currently feeling well.  His echo today is relatively unchanged from his last.  He has mild hypokinesia of the systemic RV, patent Felix with limited views of the Fontan circuit and PA.    Plan:  - Echocardiogram unchanged from previous. - cMRI to evaluate Fontan circuit, not well seen on echo - UTD with Hepatologist, has liver scan next week.  - Holter in 09/2024 with rare ectopy.  - Patient has lab work throughout the year, but some annual testing missing. Ordered Vitamin D, BNP, GGT, TSH, Free T4, ESR, CRP.  - Continue Xarelto for recurrent renal infarcts. - Tolerating Propranolol ER 60mg PO qday and Adderall XL 15mg PO qday without issue or worsening palpitations. - No s/s of active bleeding on Xarelto.   SBE Prophylaxis We also discussed good dental hygiene with routine dental visits every six months. As per ACC guidelines, endocarditis prophylaxis is recommended in those undergoing dental procedures with prosthetic cardiac valves, unrepaired cyanotic heart disease including palliative shunts and conduits, repaired CHD with residual defects at site of adjacent to patch or prosthetic device, repaired CHD with prosthetic material or device during the first 6 months after the procedure and in those patients with a history of infective endocarditis. Amoxicillin 2gm or another appropriate antibiotic should be taken 30 to 60 minutes prior to the procedure.   Heart Healthy Lifestyle  We discussed a heart-healthy lifestyle, including Mediterranean diet, weight management, and avoiding smoking/vaping and excessive alcohol. We also discussed exercising 30 minute a day.

## 2025-01-06 NOTE — HISTORY OF PRESENT ILLNESS
[facility]     Electrophysiology                                     Progress Note    Admission date:  2024    Reason for follow up visit: AF/AFL    HPI/CC: Isac Lemus was admitted on 2023 to the ARU from  after traumatic fall resulting in skull fracture and SAH/SDH. Complicated hospital course. EP consulted when EKG showed rapid AFL. An cardiac event monitor was placed on 2024 but this was removed 2024 at the family's request due to skin irritation.  On 2024, patient was admitted to the hospital for surgical debridement of his sacral wound.  Postoperatively he had RVR and IV diltiazem was started.  Rhythm is atrial flutter with heart rates in the 80s and 90s.    Subjective: Unable to assess given mental status. Family at bedside.     Vitals:  Blood pressure 128/75, pulse 85, temperature 98.7 °F (37.1 °C), temperature source Axillary, resp. rate 18, height 1.702 m (5' 7\"), weight 100.1 kg (220 lb 10.9 oz), SpO2 95 %.  Temp  Av.2 °F (36.8 °C)  Min: 97.5 °F (36.4 °C)  Max: 98.7 °F (37.1 °C)  Pulse  Av  Min: 78  Max: 113  BP  Min: 113/61  Max: 142/87  SpO2  Av %  Min: 92 %  Max: 98 %  FiO2   Av %  Min: 45 %  Max: 45 %    24 hour I/O    Intake/Output Summary (Last 24 hours) at 2024 1301  Last data filed at 2024 1000  Gross per 24 hour   Intake 640 ml   Output 100 ml   Net 540 ml       Current Facility-Administered Medications   Medication Dose Route Frequency Provider Last Rate Last Admin    vashe wound therapy external solution   Topical BID Kenny Ernandez MD   Given at 24 0537    traMADol (ULTRAM) tablet 50 mg  50 mg Oral Q6H PRN Eunice Seaman APRN - CNP   50 mg at 24 0313    glucose chewable tablet 16 g  4 tablet Oral PRN Kenny Ernandez MD        dextrose bolus 10% 125 mL  125 mL IntraVENous PRN Kenny Ernandez MD        Or    dextrose bolus 10% 250 mL  250 mL IntraVENous PRN Kenny Ernandez MD        glucagon (rDNA)  [FreeTextEntry1] : - 25: had bloodwork at last visit. US abdomen not done. Had fibroscan today. Feels good, tells me again that he has become much more focused and efficient since Adderall was started. Takes propranolol 15 mg/d, denies side effects. Exam: Labs 10/8/24: abnormal: Hb 17.8, albumin 5.3, ALT 51, INR 1.80, HbA1c 6.2%, lipids. Normal: , rest of LFTs, AFP.  - 10/8/24 (seen with Dr. 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 He saw Dr. Pop in , then me in 10/2024. Currently in [employer] school. Not working at the time because of memory issues interfering with his work. Lives at home with Parents and Grandmother. Has 1 sister.  Denies any use of alcohol, cigarettes. No family history of liver disease or cancer.   Test results: - 25 fibroscan 15.6 kPa, 217 dB/m - F4, S0  - 9/15/22 MRI/MRE wwo: subtle hepatic surface nodularity. No suspicious hepatic lesion. IVC and hepatic veins are not dilated. No steatosis, no iron deposition. Liver stiffness: 4-5 kPa - stage 3 to 4 fibrosis.  - 6/3/21: viral: HAV IgG(-), HBsAg(-), sAb(+), HCV Ab(-).               metabolic: nomral ferritin 129.

## 2025-01-06 NOTE — ASSESSMENT
[FreeTextEntry1] : Mr. 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  PLT low normal.  - imaging: MR elastography showed subtle surface nodularity, normal spleen and gallbladder. Patent hepatic veins   - no liver lesion   - DD of nodularity includes cirrhosis and NRH (nodular regenerative hyperplasia) - overweight BMI, but due to muscular built  - recently increased increased IVC pressure as per cardiology note  - forgetfulness as above: unlikely due to liver disease as bloodwork last year and imaging overall do not suggest advanced liver disease. 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   PLAN - he will provide the report of his recent ultasound from [facility] and call the day after. May need further imaging. - may need screening EGD   - forgetfulness: trial of snacks between meals - return in 6 months after bloodwork and US abdomen

## 2025-03-13 NOTE — REASON FOR VISIT
[Follow-Up] : a follow-up visit for [S/P Cardiac Surgery] : status post cardiac surgery [Patient] : patient

## 2025-03-17 NOTE — HISTORY OF PRESENT ILLNESS
[FreeTextEntry1] :  I had the pleasure of seeing KATE FRANZ at the [facility].  As you well know, Mr. 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  Past Cardiac Surgical/Interventional Procedures: 1) 1997, [place] Procedure, Dr. Ordonez, [facility]. 2) 10/1997, Bilateral bi-directional Felix, Dr. Ordonez, [facility]. 3) , Modified non-fenestrated lateral tunnel Fontan, Dr. Ordonez, [facility]. 4) 2016, Cath, Balloon and stenting to proximal LPA and stent into Fontan circuit, Dr. Doll, [facility]. 5) 2018, Diagnostic Cath, Fontan limb stent and branch PA stent patent, PCWP 12mmHg equal to RVEDP 6mmHg, CO 4.5 L/min, Qp:Qs 0.72:1, PVRi 2.62 Desai, Hepatic collateral vessel leading to desaturation could not be engaged.   Kate is seen today, 2025, and is feeling fatigued. Kate recently underwent an EGD during which he desaturated to 82%. The anesthesiologist who performed the procedure states he thought the patient's daytime sleepiness and difficulty concentrating was related to hypoxia. Kate was wondering if initiating oxygen therapy would improve his symptoms.   He does not go to bed at a consistent time and does not sleep well. He uses adderall to focus and propranolol for anxiety.   He was previously on sildenafil for his Fontan, but felt poorly on it so he d/c'ed it. He started Jardiance 5 days ago and feels less swollen on it. He felt a little dizzy the 2nd day, but it has since resolved.   He denies chest pain, shortness of breath, palpitations, cough, hemoptysis, dizziness, syncope, orthopnea, paroxysmal nocturnal dyspnea, abdominal swelling or lower extremity swelling. He can climb a flight of stairs without difficulty.   PMHx: - Renal Infarct x 2 (unknown etiology, on Xarelto) - SVT (previously on Betaxolol, D/C'd due to brain fog/sexual dysfunction) - Anxiety (on propranolol) - ADHD (on Adderall) - Costochondritis  - GERD - Biceps Tendinitis - Bilateral patellofemoral syndrome - Liver Fibrosis  Other PSHx: - Nasal Septoplasty  Family Hx: - Maternal GF- MI in his 50s, .   Social Hx: - Works as a nurse, currently in [employer] school. - Denies smoking, alcohol, or illicit drug use. - Exercises regularly with lifting low weights and cardio.  - He goes to the dentist on a regular basis.

## 2025-03-17 NOTE — CARDIOLOGY SUMMARY
[Today's Date] : [unfilled] [FreeTextEntry1] : NSR @ 68 bpm  RBBB NST [de-identified] : 12/30/2024 [FreeTextEntry2] : Summary: 1. "Hypoplastic left heart syndrome (MS, AS) status post [place]/BT shunt (as a ), followed by bilateral bi-directional Felix procedure (at 8 months of age), then non-fenestrated lateral tunnel Fontan (3 years of age). Status post transcatheter stent placement in the superior aspect of the Fontan circuit, and balloon dilation and stent placement in the proximal left pulmonary artery (3/2106)." 2. Status post surgically created interatrial communication, non restrictive. 3. There is a size discrepancy between the abigail-ascending aorta/transverse arch and proximal descending aorta, as is typical of Patrick arch reconstruction. Normal Doppler profiles across the arch.  4. The right and left superior vena cavae and cavopulmonary (Felix) anastomoses all appear widely patent with no evidence of obstruction, with low velocity spectral Doppler profiles. Limited imaging of the IVC connection with the Fontan circuit; Doppler profile with low velocity. 5. On limited imaging, the proximal RPA and proximal LPA are not seen well enough to comment. There appears to be some acceleration across the upper Fontan limb to the left of the Right Cavopulmonary PA junction. 6. Mild tricuspid valve regurgitation. 7. Dilated right ventricle and moderate right ventricular hypertrophy. 8. Mild global hypokinesia of the right ventricle. 9. No pericardial effusion. 10. There has no significant interval change. [de-identified] : 09/28/2024 [de-identified] : Patient had a min HR of 49 bpm, max HR of 129 bpm, and avg HR of 73 bpm. Predominant underlying rhythm was Sinus Rhythm. First Degree AV Block was present. Bundle Branch Block/IVCD was present. Isolated SVEs were rare (<1.0%, 10), and no SVE Couplets or SVE Triplets were present. Isolated VEs were rare (<1.0%), and no VE Couplets or VE Triplets were present. [de-identified] : 07/03/2023 [de-identified] : This was a Maximal test (RERmax 1.25). Max work = 155. Low amount of work. No atrial ectopy. Isolated PVC in recovery. No significant ST segment changes.  The patient's max VO2= 2028 ml/min (59% of predicted).  No evidence of restrictive or obstructive lung disease. No intracardiac shunting.  Baseline O2 sat = 86, Minimum O2 sat = 80%, Maximum O2 sat = 87%. [de-identified] : 01/15/2025 [de-identified] : Impression: Hypoplastic left heart syndrome with severely hypoplastic left heart. Mildly dilated right ventricle (RVEDVi: 126.7 ml/m2; z: 2.87) with mild to moderately decreased systolic function (RVEF: 38%, z: -4.62). No significant changes compared to prior MRI 2021: RVEDVi was 115 ml/m2; z: 2.0 and RVEF was 39%; z: -4). There is transmural hyperenhancement of mid and apical segments of the right ventricle and septum (see details above) - similar to prior. There is mild or less tricuspid valve regurgitation based on cine SSFP images.  Trivial neoaortic regurgitation (RF: 5%) similar to prior MRI (RF was 7%). Patent DKS anastomosis (connection of the native aorta and abigail aorta). There is a size discrepancy between the abigail ascending aorta/transverse arch and proximal thoracic descending aorta as seen in a Travis reconstruction of the arch but no discrete stenosis.  Bilateral Felix anastomosis and Fontan conduit is widely patent including the stent in the IVC limb. Stented central pulmonary artery (between the SVCs) appears patent on DIR sequences and appears mildly hypoplastic. No discrete distal branch pulmonary artery stenosis.  PC imaging demonstrates a Qp/Qs of 0.89:1. Estimated differential flow to the lungs is 49% to the right and 51% to the left (taking PC flows). Dilated tortuous veno-venous collateral arising from hepatic/IVC, crosses the left of spine and appears to drain into the left sided pulmonary veins. No obvious major aortopulmonary collaterals seen.  [de-identified] : 02/20/2018 [FreeTextEntry3] : Conclusions: 1. Normal cardiac output of 4.5 L/min. 2. Right to left shunt likely from hepatic venous collateral with Qp:Qs of 0.7:1. 3. No evidence of pulmonary AVMs or veno-venous collaterals.  4. Single hepatic vein collateral with a tortuous course draining in the left atrium.  5. Un-obstructed Fontan circuit.  6. PVR: 2.6 WUxM2. Wedged pressure correlated with RVEDP at 6 mmHg.

## 2025-03-17 NOTE — DISCUSSION/SUMMARY
[Needs SBE Prophylaxis] : [unfilled]  needs bacterial endocarditis prophylaxis. SBE prophylaxis is indicated for dental and invasive ENT procedures. (Circulation. 2007; 116: 3888-3642) [FreeTextEntry1] : We had the pleasure of seeing Yasmani Lorenzo in [facility]. 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   Past Cardiac Surgical/Interventional Procedures: 1) 02/1997, [place] Procedure, Dr. Ordonez, [facility]. 2) 10/1997, Bilateral bi-directional Felix, Dr. Ordonez, [facility]. 3) 2000, Modified non-fenestrated lateral tunnel Fontan, Dr. Ordonez, [facility]. 4) 03/21/2016, Cath, Balloon and stenting to proximal LPA and stent into Fontan circuit, Dr. Doll, [facility]. 5) 02/20/2018, Diagnostic Cath, Fontan limb stent and branch PA stent patent, PCWP 12mmHg equal to RVEDP 6mmHg, CO 4.5 L/min, Qp:Qs 0.72:1, PVRi 2.62 Desai, Hepatic collateral vessel leading to desaturation could not be engaged.   He is currently experiencing worsening fatigue, and is concerned it is due to hypoxia although his saturations have been consistently low for many years.   Cardiac MRI 01/2025 with mildly dilated RV with mild to moderately decreased systolic function (RVEF: 38%). Also with transmural hyperenhancement of mid and apical segments of RV and septum. Bilateral Felix and Fontan are widely patent including the stent in the IVC limb. Dilated tortuous veno-venous collateral from hepatic/IVC into left sided pulmonary veins. Similar to cMRI 2021.   Plan: - Initiated Jardiance and Entresto. labs in one week - Consider transitioning propranolol to Toprol XL for heart failure.  - Continue Xarelto for recurrent renal infracts. - refer for catheterization   - discussed healthy habits include good sleep hygiene, diet and exercise - UTD Hepatologist.    SBE Prophylaxis We also discussed good dental hygiene with routine dental visits every six months. As per ACC guidelines, endocarditis prophylaxis is recommended in those undergoing dental procedures with prosthetic cardiac valves, unrepaired cyanotic heart disease including palliative shunts and conduits, repaired CHD with residual defects at site of adjacent to patch or prosthetic device, repaired CHD with prosthetic material or device during the first 6 months after the procedure and in those patients with a history of infective endocarditis. Amoxicillin 2gm or another appropriate antibiotic should be taken 30 to 60 minutes prior to the procedure.   Heart Healthy Lifestyle We discussed a heart-healthy lifestyle, including Mediterranean diet, weight management, and avoiding smoking/vaping and excessive alcohol. We also discussed exercising 30 minute a day.   70 min

## 2025-03-17 NOTE — CARDIOLOGY SUMMARY
[Today's Date] : [unfilled] [FreeTextEntry1] : NSR @ 68 bpm  RBBB NST [de-identified] : 12/30/2024 [FreeTextEntry2] : Summary: 1. "Hypoplastic left heart syndrome (MS, AS) status post [place]/BT shunt (as a ), followed by bilateral bi-directional Felix procedure (at 8 months of age), then non-fenestrated lateral tunnel Fontan (3 years of age). Status post transcatheter stent placement in the superior aspect of the Fontan circuit, and balloon dilation and stent placement in the proximal left pulmonary artery (3/2106)." 2. Status post surgically created interatrial communication, non restrictive. 3. There is a size discrepancy between the abigail-ascending aorta/transverse arch and proximal descending aorta, as is typical of [place] arch reconstruction. Normal Doppler profiles across the arch.  4. The right and left superior vena cavae and cavopulmonary (Felix) anastomoses all appear widely patent with no evidence of obstruction, with low velocity spectral Doppler profiles. Limited imaging of the IVC connection with the Fontan circuit; Doppler profile with low velocity. 5. On limited imaging, the proximal RPA and proximal LPA are not seen well enough to comment. There appears to be some acceleration across the upper Fontan limb to the left of the Right Cavopulmonary PA junction. 6. Mild tricuspid valve regurgitation. 7. Dilated right ventricle and moderate right ventricular hypertrophy. 8. Mild global hypokinesia of the right ventricle. 9. No pericardial effusion. 10. There has no significant interval change. [de-identified] : 09/28/2024 [de-identified] : Patient had a min HR of 49 bpm, max HR of 129 bpm, and avg HR of 73 bpm. Predominant underlying rhythm was Sinus Rhythm. First Degree AV Block was present. Bundle Branch Block/IVCD was present. Isolated SVEs were rare (<1.0%, 10), and no SVE Couplets or SVE Triplets were present. Isolated VEs were rare (<1.0%), and no VE Couplets or VE Triplets were present. [de-identified] : 07/03/2023 [de-identified] : This was a Maximal test (RERmax 1.25). Max work = 155. Low amount of work. No atrial ectopy. Isolated PVC in recovery. No significant ST segment changes.  The patient's max VO2= 2028 ml/min (59% of predicted).  No evidence of restrictive or obstructive lung disease. No intracardiac shunting.  Baseline O2 sat = 86, Minimum O2 sat = 80%, Maximum O2 sat = 87%. [de-identified] : 01/15/2025 [de-identified] : Impression: Hypoplastic left heart syndrome with severely hypoplastic left heart. Mildly dilated right ventricle (RVEDVi: 126.7 ml/m2; z: 2.87) with mild to moderately decreased systolic function (RVEF: 38%, z: -4.62). No significant changes compared to prior MRI 2021: RVEDVi was 115 ml/m2; z: 2.0 and RVEF was 39%; z: -4). There is transmural hyperenhancement of mid and apical segments of the right ventricle and septum (see details above) - similar to prior. There is mild or less tricuspid valve regurgitation based on cine SSFP images.  Trivial neoaortic regurgitation (RF: 5%) similar to prior MRI (RF was 7%). Patent DKS anastomosis (connection of the native aorta and abigail aorta). There is a size discrepancy between the abigail ascending aorta/transverse arch and proximal thoracic descending aorta as seen in a Travis reconstruction of the arch but no discrete stenosis.  Bilateral Felix anastomosis and Fontan conduit is widely patent including the stent in the IVC limb. Stented central pulmonary artery (between the SVCs) appears patent on DIR sequences and appears mildly hypoplastic. No discrete distal branch pulmonary artery stenosis.  PC imaging demonstrates a Qp/Qs of 0.89:1. Estimated differential flow to the lungs is 49% to the right and 51% to the left (taking PC flows). Dilated tortuous veno-venous collateral arising from hepatic/IVC, crosses the left of spine and appears to drain into the left sided pulmonary veins. No obvious major aortopulmonary collaterals seen.  [de-identified] : 02/20/2018 [FreeTextEntry3] : Conclusions: 1. Normal cardiac output of 4.5 L/min. 2. Right to left shunt likely from hepatic venous collateral with Qp:Qs of 0.7:1. 3. No evidence of pulmonary AVMs or veno-venous collaterals.  4. Single hepatic vein collateral with a tortuous course draining in the left atrium.  5. Un-obstructed Fontan circuit.  6. PVR: 2.6 WUxM2. Wedged pressure correlated with RVEDP at 6 mmHg.

## 2025-03-17 NOTE — PHYSICAL EXAM
[General Appearance - Alert] : alert [General Appearance - Well Nourished] : well nourished [General Appearance - Well-Appearing] : well appearing [] : no respiratory distress [Respiration, Rhythm And Depth] : normal respiratory rhythm and effort [Chest Surgical / Traumatic Scar] : chest incision well healed [Heart Rate And Rhythm] : normal heart rate and rhythm [Heart Sounds] : normal S1 and S2 [Abdomen Soft] : soft [Nail Clubbing] : no clubbing  or cyanosis of the fingernails

## 2025-03-17 NOTE — HISTORY OF PRESENT ILLNESS
[FreeTextEntry1] :  I had the pleasure of seeing KATE FRANZ at the [facility].  As you well know, Mr. 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  Past Cardiac Surgical/Interventional Procedures: 1) 1997, Travis Procedure, Dr. Ordonez, [facility]. 2) 10/1997, Bilateral bi-directional Felix, Dr. Ordonez, [facility]. 3) , Modified non-fenestrated lateral tunnel Fontan, Dr. Ordonez, [facility]. 4) 2016, Cath, Balloon and stenting to proximal LPA and stent into Fontan circuit, Dr. Doll, [facility]. 5) 2018, Diagnostic Cath, Fontan limb stent and branch PA stent patent, PCWP 12mmHg equal to RVEDP 6mmHg, CO 4.5 L/min, Qp:Qs 0.72:1, PVRi 2.62 Desai, Hepatic collateral vessel leading to desaturation could not be engaged.   Kate is seen today, 2025, and is feeling fatigued. Kate recently underwent an EGD during which he desaturated to 82%. The anesthesiologist who performed the procedure states he thought the patient's daytime sleepiness and difficulty concentrating was related to hypoxia. Kate was wondering if initiating oxygen therapy would improve his symptoms.   He does not go to bed at a consistent time and does not sleep well. He uses adderall to focus and propranolol for anxiety.   He was previously on sildenafil for his Fontan, but felt poorly on it so he d/c'ed it. He started Jardiance 5 days ago and feels less swollen on it. He felt a little dizzy the 2nd day, but it has since resolved.   He denies chest pain, shortness of breath, palpitations, cough, hemoptysis, dizziness, syncope, orthopnea, paroxysmal nocturnal dyspnea, abdominal swelling or lower extremity swelling. He can climb a flight of stairs without difficulty.   PMHx: - Renal Infarct x 2 (unknown etiology, on Xarelto) - SVT (previously on Betaxolol, D/C'd due to brain fog/sexual dysfunction) - Anxiety (on propranolol) - ADHD (on Adderall) - Costochondritis  - GERD - Biceps Tendinitis - Bilateral patellofemoral syndrome - Liver Fibrosis  Other PSHx: - Nasal Septoplasty  Family Hx: - Maternal GF- MI in his 50s, .   Social Hx: - Works as a nurse, currently in [employer] school. - Denies smoking, alcohol, or illicit drug use. - Exercises regularly with lifting low weights and cardio.  - He goes to the dentist on a regular basis.

## 2025-03-17 NOTE — DISCUSSION/SUMMARY
[Needs SBE Prophylaxis] : [unfilled]  needs bacterial endocarditis prophylaxis. SBE prophylaxis is indicated for dental and invasive ENT procedures. (Circulation. 2007; 116: 9207-6285) [FreeTextEntry1] : We had the pleasure of seeing Yasmani Lorenzo in [facility]. 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   Past Cardiac Surgical/Interventional Procedures: 1) 02/1997, [place] Procedure, Dr. Ordonez, [facility]. 2) 10/1997, Bilateral bi-directional Felix, Dr. Ordonez, [facility]. 3) 2000, Modified non-fenestrated lateral tunnel Fontan, Dr. Ordonez, [facility]. 4) 03/21/2016, Cath, Balloon and stenting to proximal LPA and stent into Fontan circuit, Dr. Doll, [facility]. 5) 02/20/2018, Diagnostic Cath, Fontan limb stent and branch PA stent patent, PCWP 12mmHg equal to RVEDP 6mmHg, CO 4.5 L/min, Qp:Qs 0.72:1, PVRi 2.62 Desai, Hepatic collateral vessel leading to desaturation could not be engaged.   He is currently experiencing worsening fatigue, and is concerned it is due to hypoxia although his saturations have been consistently low for many years.   Cardiac MRI 01/2025 with mildly dilated RV with mild to moderately decreased systolic function (RVEF: 38%). Also with transmural hyperenhancement of mid and apical segments of RV and septum. Bilateral Felix and Fontan are widely patent including the stent in the IVC limb. Dilated tortuous veno-venous collateral from hepatic/IVC into left sided pulmonary veins. Similar to cMRI 2021.   Plan: - Initiated Jardiance and Entresto. labs in one week - Consider transitioning propranolol to Toprol XL for heart failure.  - Continue Xarelto for recurrent renal infracts. - refer for catheterization   - discussed healthy habits include good sleep hygiene, diet and exercise - UTD Hepatologist.    SBE Prophylaxis We also discussed good dental hygiene with routine dental visits every six months. As per ACC guidelines, endocarditis prophylaxis is recommended in those undergoing dental procedures with prosthetic cardiac valves, unrepaired cyanotic heart disease including palliative shunts and conduits, repaired CHD with residual defects at site of adjacent to patch or prosthetic device, repaired CHD with prosthetic material or device during the first 6 months after the procedure and in those patients with a history of infective endocarditis. Amoxicillin 2gm or another appropriate antibiotic should be taken 30 to 60 minutes prior to the procedure.   Heart Healthy Lifestyle We discussed a heart-healthy lifestyle, including Mediterranean diet, weight management, and avoiding smoking/vaping and excessive alcohol. We also discussed exercising 30 minute a day.   70 min

## 2025-03-17 NOTE — PHYSICAL EXAM
[General Appearance - Alert] : alert [General Appearance - Well Nourished] : well nourished [General Appearance - Well-Appearing] : well appearing [] : no respiratory distress [Chest Surgical / Traumatic Scar] : chest incision well healed [Respiration, Rhythm And Depth] : normal respiratory rhythm and effort [Heart Rate And Rhythm] : normal heart rate and rhythm [Abdomen Soft] : soft [Heart Sounds] : normal S1 and S2 [Nail Clubbing] : no clubbing  or cyanosis of the fingernails

## 2025-05-09 NOTE — PHYSICAL EXAM
[General Appearance - Alert] : alert [General Appearance - Well Nourished] : well nourished [General Appearance - Well-Appearing] : well appearing [] : no respiratory distress [Respiration, Rhythm And Depth] : normal respiratory rhythm and effort [Chest Surgical / Traumatic Scar] : chest incision well healed [Heart Rate And Rhythm] : normal heart rate and rhythm [Heart Sounds] : normal S1 and S2 [Systolic] : systolic [II] : a grade 2/6 [Apical] : apex [Holosystolic] : holosystolic [Back] : the murmur was transmitted to the back [Abdomen Soft] : soft [Nail Clubbing] : no clubbing  or cyanosis of the fingernails

## 2025-05-09 NOTE — DISCUSSION/SUMMARY
[FreeTextEntry1] : In summary, Yasmani is a 28-year-old history of hypoplastic left heart syndrome status post lateral tunnel Fontan who recently had a cardiac catheterization for embolization of a hepatic vein to pulmonary vein collateral as well as balloon dilation of existing stents with excellent results.  Yasmani's reports to be feeling better since the procedure.  Once again we went over his cardiac anomaly and understanding of Fontan physiology including importance of future surveillance for failing Fontan circulation.  He will follow-up with Dr. Jeff Elliott with whom he feels comfortable to continue care in the [facility] clinic.  Next appointment will be in 6 months.

## 2025-05-09 NOTE — HISTORY OF PRESENT ILLNESS
[FreeTextEntry1] : I had the pleasure of evaluating Yasmani in our pediatric cardiology clinic today on May 9, 2025 as a postcardiac catheterization follow-up. Yasmani had a cardiac catheterization on 2025.  Below are the pertinent hemodynamic findings: 1.  Normal cardiac index by Francisco Javier principle 2.  Normal pulmonary vascular resistance of less than 2 units/m 3.  Normal RVEDP/wedge pressure of 9 improved to 6 mm on hemodynamic challenge with dobutamine. 4.  Mild desaturation due to venous collateral from hepatic circulation to the left lower pulmonary vein Following interventions were done: 1.  Coil occlusion of hepatic vein to left pulmonary vein collateral using multiple packing coils with complete occlusion. 2.  Serial balloon dilation of left pulmonary artery stent with a 16 mm [place] high-pressure balloon with excellent expansion. 3.  Serial balloon dilation of the existing stent in the Fontan conduit using a 20 mm [place] balloon with excellent expansion. Yasmani had an uneventful post catheterization course and was discharged home with all his medication regimen.  He experienced migraine headaches after few days that has resolved.  Yasmani reports feeling more energetic and able to sleep well at night.  He comes in today for a post cath follow-up visit with plans to have his future visits with Dr. Jeff Elliott from [facility] clinic whom he has already met while inpatient.    As you well know YASMANI FRANZ  is followed in our [facility].  Mr. 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  In 2016 he had a cardiac catheterization and required a LPA stent and Fontan baffle stent for narrowing.  This hepatic collateral vessel entering the left pulmonary venous circuit was detected but were unable to close due to its tortuosity. Recently, he was referred for general sense of fatigue, inability to sleep and concentrating at work.  He also suffers from anxiety and ADHD for he was on Adderall and propranolol in the past.  Past Cardiac Surgical/Interventional Procedures: 1) 1997, Travis Procedure, Dr. Ordonez, [facility]. 2) 10/1997, Bilateral bi-directional Felix, Dr. Ordonez, [facility]. 3) , Modified non-fenestrated lateral tunnel Fontan, Dr. Ordonez, [facility] [place]. 4) 2016, Cath, Balloon and stenting to proximal LPA and stent into Fontan circuit, Dr. Doll, [facility]. 5) 2018, Diagnostic Cath, Fontan limb stent and branch PA stent patent, PCWP 12mmHg equal to RVEDP 6mmHg, CO 4.5 L/min, Qp:Qs 0.72:1, PVRi 2.62 Desai, Hepatic collateral vessel leading to desaturation could not be engaged.   He was previously on sildenafil for his Fontan, but felt poorly on it so he d/c'ed it. He started Jardiance 5 days ago and feels less swollen on it. He felt a little dizzy the 2nd day, but it has since resolved.   He denies chest pain, shortness of breath, palpitations, cough, hemoptysis, dizziness, syncope, orthopnea, paroxysmal nocturnal dyspnea, abdominal swelling or lower extremity swelling. He can climb a flight of stairs without difficulty.   PMHx: - Renal Infarct x 2 (unknown etiology, on Xarelto) - SVT (previously on Betaxolol, D/C'd due to brain fog/sexual dysfunction) - Anxiety (on propranolol) - ADHD (on Adderall) - Costochondritis  - GERD - Biceps Tendinitis - Bilateral patellofemoral syndrome - Liver Fibrosis  Other PSHx: - Nasal Septoplasty  Family Hx: - Maternal GF- MI in his 50s, .   Social Hx: - Works as a nurse, currently in [employer] school. - Denies smoking, alcohol, or illicit drug use. - Exercises regularly with lifting low weights and cardio.  - He goes to the dentist on a regular basis.

## 2025-05-09 NOTE — CONSULT LETTER
[Today's Date] : [unfilled] [Name] : Name: [unfilled] [] : : ~~ [Today's Date:] : [unfilled] [Dear  ___:] : Dear Dr. [unfilled]: [Consult] : I had the pleasure of evaluating your patient, [unfilled]. My full evaluation follows. [Consult - Single Provider] : Thank you very much for allowing me to participate in the care of this patient. If you have any questions, please do not hesitate to contact me. [Sincerely,] : Sincerely, [FreeTextEntry4] : Dr Sujata Blair [FreeTextEntry5] : [phone] [facility], [place], operated by [facility] [address] [FreeTextEntry6] : [facility] [id number] [de-identified] : Jim Doll MD Congenital interventional Cardiologist [facility] , [facility] [employer] Telephone: [phone] [phone]